# Patient Record
Sex: MALE | Race: WHITE | NOT HISPANIC OR LATINO | URBAN - METROPOLITAN AREA
[De-identification: names, ages, dates, MRNs, and addresses within clinical notes are randomized per-mention and may not be internally consistent; named-entity substitution may affect disease eponyms.]

---
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
## 2023-02-07 NOTE — PROCEDURE: TREATMENT REGIMEN
Patient transferred to CDU by ED staff. Patient in hospital bed at this time. Bed in lowest position and locked with bed alarm active. Fall bracelet placed on patient and patient educated to call when needing to get out of bed.   
Continue Regimen: 10% BP Gel at bedtime
Initiate Treatment: Bactrim DS twice daily
Detail Level: Zone